# Patient Record
Sex: FEMALE | Race: BLACK OR AFRICAN AMERICAN
[De-identification: names, ages, dates, MRNs, and addresses within clinical notes are randomized per-mention and may not be internally consistent; named-entity substitution may affect disease eponyms.]

---

## 2017-08-02 ENCOUNTER — HOSPITAL ENCOUNTER (EMERGENCY)
Dept: HOSPITAL 53 - M ED | Age: 22
Discharge: HOME | End: 2017-08-02
Payer: OTHER GOVERNMENT

## 2017-08-02 VITALS — SYSTOLIC BLOOD PRESSURE: 112 MMHG | DIASTOLIC BLOOD PRESSURE: 69 MMHG

## 2017-08-02 VITALS — BODY MASS INDEX: 28.24 KG/M2 | WEIGHT: 153.44 LBS | HEIGHT: 62 IN

## 2017-08-02 DIAGNOSIS — F17.200: ICD-10-CM

## 2017-08-02 DIAGNOSIS — Z91.013: ICD-10-CM

## 2017-08-02 DIAGNOSIS — M79.1: ICD-10-CM

## 2017-08-02 DIAGNOSIS — J30.2: ICD-10-CM

## 2017-08-02 DIAGNOSIS — L50.9: Primary | ICD-10-CM

## 2017-08-11 ENCOUNTER — HOSPITAL ENCOUNTER (EMERGENCY)
Dept: HOSPITAL 53 - M ED | Age: 22
Discharge: HOME | End: 2017-08-11
Payer: OTHER GOVERNMENT

## 2017-08-11 VITALS — SYSTOLIC BLOOD PRESSURE: 113 MMHG | DIASTOLIC BLOOD PRESSURE: 65 MMHG

## 2017-08-11 VITALS — HEIGHT: 62 IN | BODY MASS INDEX: 28.93 KG/M2 | WEIGHT: 157.19 LBS

## 2017-08-11 DIAGNOSIS — N80.9: ICD-10-CM

## 2017-08-11 DIAGNOSIS — F17.200: ICD-10-CM

## 2017-08-11 DIAGNOSIS — N94.6: Primary | ICD-10-CM

## 2017-08-11 DIAGNOSIS — Z91.013: ICD-10-CM

## 2017-08-11 DIAGNOSIS — J45.909: ICD-10-CM

## 2017-08-11 PROCEDURE — 96372 THER/PROPH/DIAG INJ SC/IM: CPT

## 2017-08-11 PROCEDURE — 87086 URINE CULTURE/COLONY COUNT: CPT

## 2017-08-11 PROCEDURE — 81001 URINALYSIS AUTO W/SCOPE: CPT

## 2017-08-11 PROCEDURE — 99282 EMERGENCY DEPT VISIT SF MDM: CPT

## 2017-09-11 ENCOUNTER — HOSPITAL ENCOUNTER (EMERGENCY)
Dept: HOSPITAL 53 - M ED | Age: 22
Discharge: HOME | End: 2017-09-11
Payer: OTHER GOVERNMENT

## 2017-09-11 VITALS — WEIGHT: 157.19 LBS | BODY MASS INDEX: 28.93 KG/M2 | HEIGHT: 62 IN

## 2017-09-11 VITALS — DIASTOLIC BLOOD PRESSURE: 66 MMHG | SYSTOLIC BLOOD PRESSURE: 100 MMHG

## 2017-09-11 DIAGNOSIS — R04.2: ICD-10-CM

## 2017-09-11 DIAGNOSIS — J02.0: Primary | ICD-10-CM

## 2017-09-11 DIAGNOSIS — Z91.013: ICD-10-CM

## 2017-09-11 DIAGNOSIS — J45.909: ICD-10-CM

## 2017-09-11 LAB
ANION GAP SERPL CALC-SCNC: 9 MEQ/L (ref 8–16)
BASOPHILS # BLD AUTO: 0.1 K/MM3 (ref 0–0.2)
BASOPHILS NFR BLD AUTO: 0.6 % (ref 0–1)
BUN SERPL-MCNC: 4 MG/DL (ref 7–18)
CALCIUM SERPL-MCNC: 8.6 MG/DL (ref 8.5–10.1)
CHLORIDE SERPL-SCNC: 109 MEQ/L (ref 98–107)
CO2 SERPL-SCNC: 25 MEQ/L (ref 21–32)
CREAT SERPL-MCNC: 0.65 MG/DL (ref 0.55–1.02)
EOSINOPHIL # BLD AUTO: 0.4 K/MM3 (ref 0–0.5)
EOSINOPHIL NFR BLD AUTO: 4.2 % (ref 0–3)
ERYTHROCYTE [DISTWIDTH] IN BLOOD BY AUTOMATED COUNT: 12.4 % (ref 11.5–14.5)
GFR SERPL CREATININE-BSD FRML MDRD: > 60 ML/MIN/{1.73_M2} (ref 60–?)
GLUCOSE SERPL-MCNC: 90 MG/DL (ref 70–105)
LARGE UNSTAINED CELL #: 0.2 K/MM3 (ref 0–0.4)
LARGE UNSTAINED CELL %: 1.6 % (ref 0–4)
LYMPHOCYTES # BLD AUTO: 2 K/MM3 (ref 1.5–6.5)
LYMPHOCYTES NFR BLD AUTO: 18.5 % (ref 24–44)
MCH RBC QN AUTO: 29 PG (ref 27–33)
MCHC RBC AUTO-ENTMCNC: 33.1 G/DL (ref 32–36.5)
MCV RBC AUTO: 87.8 FL (ref 80–96)
MONOCYTES # BLD AUTO: 0.6 K/MM3 (ref 0–0.8)
MONOCYTES NFR BLD AUTO: 5.6 % (ref 0–5)
NEUTROPHILS # BLD AUTO: 7 K/MM3 (ref 1.8–7.7)
NEUTROPHILS NFR BLD AUTO: 69.6 % (ref 36–66)
PLATELET # BLD AUTO: 367 K/MM3 (ref 150–450)
POTASSIUM SERPL-SCNC: 3.7 MEQ/L (ref 3.5–5.1)
SODIUM SERPL-SCNC: 143 MEQ/L (ref 136–145)
WBC # BLD AUTO: 10 K/MM3 (ref 4–10)

## 2018-09-19 ENCOUNTER — HOSPITAL ENCOUNTER (EMERGENCY)
Dept: HOSPITAL 53 - M ED | Age: 23
Discharge: HOME | End: 2018-09-19
Payer: COMMERCIAL

## 2018-09-19 DIAGNOSIS — J45.901: ICD-10-CM

## 2018-09-19 DIAGNOSIS — J02.9: Primary | ICD-10-CM

## 2018-09-19 DIAGNOSIS — F17.210: ICD-10-CM

## 2018-09-19 DIAGNOSIS — N80.9: ICD-10-CM

## 2018-09-19 DIAGNOSIS — Z91.013: ICD-10-CM

## 2018-09-19 PROCEDURE — 87880 STREP A ASSAY W/OPTIC: CPT

## 2018-12-26 ENCOUNTER — HOSPITAL ENCOUNTER (EMERGENCY)
Dept: HOSPITAL 53 - M ED | Age: 23
Discharge: HOME | End: 2018-12-26
Payer: COMMERCIAL

## 2018-12-26 VITALS — DIASTOLIC BLOOD PRESSURE: 68 MMHG | SYSTOLIC BLOOD PRESSURE: 114 MMHG

## 2018-12-26 VITALS — BODY MASS INDEX: 29.51 KG/M2 | WEIGHT: 160.34 LBS | HEIGHT: 62 IN

## 2018-12-26 DIAGNOSIS — N80.9: ICD-10-CM

## 2018-12-26 DIAGNOSIS — J02.9: Primary | ICD-10-CM

## 2018-12-26 DIAGNOSIS — Z91.013: ICD-10-CM

## 2018-12-26 DIAGNOSIS — J45.909: ICD-10-CM

## 2018-12-26 DIAGNOSIS — B34.9: ICD-10-CM

## 2018-12-26 LAB
FLUAV RNA UPPER RESP QL NAA+PROBE: NEGATIVE
FLUBV RNA UPPER RESP QL NAA+PROBE: NEGATIVE

## 2019-02-22 ENCOUNTER — HOSPITAL ENCOUNTER (EMERGENCY)
Dept: HOSPITAL 53 - M ED | Age: 24
LOS: 1 days | Discharge: HOME | End: 2019-02-23
Payer: COMMERCIAL

## 2019-02-22 VITALS — BODY MASS INDEX: 31.35 KG/M2 | HEIGHT: 62 IN | WEIGHT: 170.35 LBS

## 2019-02-22 DIAGNOSIS — J45.909: ICD-10-CM

## 2019-02-22 DIAGNOSIS — Z91.013: ICD-10-CM

## 2019-02-22 DIAGNOSIS — K92.1: ICD-10-CM

## 2019-02-22 DIAGNOSIS — R10.32: ICD-10-CM

## 2019-02-22 DIAGNOSIS — F17.200: ICD-10-CM

## 2019-02-22 DIAGNOSIS — N80.9: ICD-10-CM

## 2019-02-22 DIAGNOSIS — K52.9: Primary | ICD-10-CM

## 2019-02-22 DIAGNOSIS — Z79.899: ICD-10-CM

## 2019-02-22 LAB
BASOPHILS # BLD AUTO: 0.1 10^3/UL (ref 0–0.2)
BASOPHILS NFR BLD AUTO: 0.5 % (ref 0–1)
BUN SERPL-MCNC: 5 MG/DL (ref 7–18)
CALCIUM SERPL-MCNC: 8.5 MG/DL (ref 8.5–10.1)
CHLORIDE SERPL-SCNC: 106 MEQ/L (ref 98–107)
CO2 SERPL-SCNC: 25 MEQ/L (ref 21–32)
CREAT SERPL-MCNC: 0.81 MG/DL (ref 0.55–1.3)
EOSINOPHIL # BLD AUTO: 0.3 10^3/UL (ref 0–0.5)
EOSINOPHIL NFR BLD AUTO: 2.9 % (ref 0–3)
GFR SERPL CREATININE-BSD FRML MDRD: > 60 ML/MIN/{1.73_M2} (ref 60–?)
GLUCOSE SERPL-MCNC: 84 MG/DL (ref 70–100)
HCT VFR BLD AUTO: 39.5 % (ref 36–47)
HGB BLD-MCNC: 13.1 G/DL (ref 12–15.5)
LYMPHOCYTES # BLD AUTO: 3.1 10^3/UL (ref 1.5–6.5)
LYMPHOCYTES NFR BLD AUTO: 32.4 % (ref 24–44)
MCH RBC QN AUTO: 27.9 PG (ref 27–33)
MCHC RBC AUTO-ENTMCNC: 33.2 G/DL (ref 32–36.5)
MCV RBC AUTO: 84.2 FL (ref 80–96)
MONOCYTES # BLD AUTO: 0.6 10^3/UL (ref 0–0.8)
MONOCYTES NFR BLD AUTO: 6.8 % (ref 0–5)
NEUTROPHILS # BLD AUTO: 5.4 10^3/UL (ref 1.8–7.7)
NEUTROPHILS NFR BLD AUTO: 57.1 % (ref 36–66)
PLATELET # BLD AUTO: 356 10^3/UL (ref 150–450)
POTASSIUM SERPL-SCNC: 3.5 MEQ/L (ref 3.5–5.1)
RBC # BLD AUTO: 4.69 10^6/UL (ref 4–5.4)
SODIUM SERPL-SCNC: 142 MEQ/L (ref 136–145)
WBC # BLD AUTO: 9.5 10^3/UL (ref 4–10)

## 2019-02-22 PROCEDURE — 85025 COMPLETE CBC W/AUTO DIFF WBC: CPT

## 2019-02-22 PROCEDURE — 74177 CT ABD & PELVIS W/CONTRAST: CPT

## 2019-02-22 PROCEDURE — 96361 HYDRATE IV INFUSION ADD-ON: CPT

## 2019-02-22 PROCEDURE — 99284 EMERGENCY DEPT VISIT MOD MDM: CPT

## 2019-02-22 PROCEDURE — 96360 HYDRATION IV INFUSION INIT: CPT

## 2019-02-22 PROCEDURE — 80048 BASIC METABOLIC PNL TOTAL CA: CPT

## 2019-02-22 PROCEDURE — 81001 URINALYSIS AUTO W/SCOPE: CPT

## 2019-02-22 PROCEDURE — 81025 URINE PREGNANCY TEST: CPT

## 2019-02-23 VITALS — SYSTOLIC BLOOD PRESSURE: 112 MMHG | DIASTOLIC BLOOD PRESSURE: 66 MMHG

## 2019-02-23 NOTE — REPVR
EXAM: 

 CT Abdomen and Pelvis With Contrast 



EXAM DATE/TIME: 

 2/23/2019 12:04 AM 



CLINICAL HISTORY: 

 24 years old, female; Pain; Abdominal pain; Localized; Left lower quadrant 

(llq); Additional info: Llq pain, diarrhea 



TECHNIQUE: 

 Axial computed tomography images of the abdomen and pelvis with intravenous 

contrast. 

 All CT scans at this facility use at least one of these dose optimization 

techniques: automated exposure control; mA and/or kV adjustment per patient 

size (includes targeted exams where dose is matched to clinical indication); or 

iterative reconstruction. 

 Coronal and sagittal reformatted images were created and reviewed. 



CONTRAST: 

 Contrast Material: 100 ml of iso; Contrast Route: ac 



COMPARISON: 

 No relevant prior studies available. 



FINDINGS: 

 Lower thorax:  No suspicious mass or airspace process in the visualized lung 

bases. 



ABDOMEN: 

 Liver:  Liver appears normal with no focal abnormality. 

 Gallbladder and bile ducts:  Gallbladder is present and shows no evidence of 

gallstone. 

 Pancreas:  Pancreas appears normal. No focal mass or peripancreatic 

inflammation. 

 Spleen:  Spleen appears homogeneous without focal mass. 

 Adrenals:  Adrenal glands are normal in appearance. 

 Kidneys and ureters:  Kidneys appear normal, with no stone, solid mass or 

hydronephrosis. 

 Stomach and bowel:  No evidence of small bowel obstruction. Multiple 

fluid-filled loops of nondilated bowel with enhancing mucosa are present. No 

evidence of acute diverticulitis. 

 Appendix:  Normal caliber appendix is identified, with no adjacent 

inflammation. 



PELVIS: 

 Bladder:  Bladder appears normal. 

 Reproductive: Unremarkable as visualized. 



ABDOMEN and PELVIS: 

 Intraperitoneal space:  No pneumoperitoneum. Trace free fluid is present in 

the pelvis. 

 Bones/joints:  Bony structures show no acute fracture or destructive process. 

 Soft tissues: Unremarkable. 

 Vasculature:  Main portal and splenic veins enhance normally. No aortic 

aneurysm. 

 Lymph nodes: Normal. No enlarged lymph nodes. 



IMPRESSION: 

1. Pattern of small bowel and colon suggests mild inflammatory or infectious 

enteritis without obstruction. 

2. No evidence of appendicitis or diverticulitis. 



Electronically signed by: Winston Gibbs On 02/23/2019  00:30:59 AM

## 2019-04-08 ENCOUNTER — HOSPITAL ENCOUNTER (EMERGENCY)
Dept: HOSPITAL 53 - M ED | Age: 24
Discharge: HOME | End: 2019-04-08
Payer: COMMERCIAL

## 2019-04-08 VITALS — WEIGHT: 170.35 LBS | HEIGHT: 62 IN | BODY MASS INDEX: 31.35 KG/M2

## 2019-04-08 VITALS — DIASTOLIC BLOOD PRESSURE: 56 MMHG | SYSTOLIC BLOOD PRESSURE: 109 MMHG

## 2019-04-08 DIAGNOSIS — Z91.013: ICD-10-CM

## 2019-04-08 DIAGNOSIS — F17.200: ICD-10-CM

## 2019-04-08 DIAGNOSIS — J09.X2: Primary | ICD-10-CM

## 2019-04-08 LAB
FLUAV RNA UPPER RESP QL NAA+PROBE: POSITIVE
FLUBV RNA UPPER RESP QL NAA+PROBE: NEGATIVE

## 2024-08-12 NOTE — REP
Clinical:  bloody cough.

 

Comparison: none.

 

Technique:  PA and lateral.

 

Findings:

The mediastinum and cardiac silhouette are normal.  The lung fields are clear and

without acute consolidation, effusion, or pneumothorax.  The skeletal structures

are intact and normal.

 

Impression:

1.   No acute cardiopulmonary process.

 

 

Signed by

Rojelio Payton MD 09/11/2017 08:24 A Patient has appointment tomorrow. Will address then.